# Patient Record
Sex: FEMALE | Race: OTHER | NOT HISPANIC OR LATINO | URBAN - METROPOLITAN AREA
[De-identification: names, ages, dates, MRNs, and addresses within clinical notes are randomized per-mention and may not be internally consistent; named-entity substitution may affect disease eponyms.]

---

## 2023-08-27 ENCOUNTER — EMERGENCY (EMERGENCY)
Facility: HOSPITAL | Age: 48
LOS: 1 days | Discharge: HOME | End: 2023-08-27
Attending: EMERGENCY MEDICINE
Payer: COMMERCIAL

## 2023-08-27 VITALS
TEMPERATURE: 98 F | SYSTOLIC BLOOD PRESSURE: 115 MMHG | DIASTOLIC BLOOD PRESSURE: 78 MMHG | RESPIRATION RATE: 16 BRPM | OXYGEN SATURATION: 98 % | HEART RATE: 80 BPM

## 2023-08-27 VITALS
DIASTOLIC BLOOD PRESSURE: 77 MMHG | HEIGHT: 66 IN | HEART RATE: 54 BPM | OXYGEN SATURATION: 99 % | TEMPERATURE: 98 F | SYSTOLIC BLOOD PRESSURE: 131 MMHG | RESPIRATION RATE: 18 BRPM | WEIGHT: 130.07 LBS

## 2023-08-27 PROCEDURE — 99284 EMERGENCY DEPT VISIT MOD MDM: CPT | Mod: 25

## 2023-08-27 PROCEDURE — 73630 X-RAY EXAM OF FOOT: CPT | Mod: 26,RT

## 2023-08-27 PROCEDURE — 73610 X-RAY EXAM OF ANKLE: CPT

## 2023-08-27 PROCEDURE — 73610 X-RAY EXAM OF ANKLE: CPT | Mod: 26,RT

## 2023-08-27 PROCEDURE — 99284 EMERGENCY DEPT VISIT MOD MDM: CPT

## 2023-08-27 PROCEDURE — 73630 X-RAY EXAM OF FOOT: CPT

## 2023-08-27 RX ORDER — ACETAMINOPHEN 500 MG
975 TABLET ORAL ONCE
Refills: 0 | Status: COMPLETED | OUTPATIENT
Start: 2023-08-27 | End: 2023-08-27

## 2023-08-27 RX ADMIN — Medication 975 MILLIGRAM(S): at 20:06

## 2023-08-27 NOTE — ED PROVIDER NOTE - PHYSICAL EXAMINATION
GEN: Pt in NAD, A&O x3.  PSYCH: Affect appropriate.  EYES: Sclera white w/o injection.   ENT: Head NCAT. Neck supple FROM.  RESP: No evidence of respiratory distress.  CARDIAC: RRR  VASC: 2+ dorsalis pedis pulses b/l.  MSK: TTP right dorsal lateral foot. No ttp of malleolus, no other bony ttp b/l LE. LLimited ROM R ankle and foot due to injury, FROM remainder of b/l LE.  NEURO: Normal and equal sensation and 5/5 strength b/l LE.  SKIN: Ecchymosis fo R dorsal lateral foot with focal edema and ttp.

## 2023-08-27 NOTE — ED PROVIDER NOTE - DISCHARGE REVIEW MATERIAL PRESENTED
If you are a smoker, it is important for your health to stop smoking. Please be aware that second hand smoke is also harmful.
Negative Screen
.

## 2023-08-27 NOTE — ED ADULT NURSE NOTE - OBJECTIVE STATEMENT
Pt is a 47 y/o female presents to the ED c/o R ankle injury this afternoon. States she rolled ankle while walking, unable to bear weight after sustaining injury. Pt presents alert & oriented x 4, calm, Breathing spontaneous & nonlabored. Swelling seen to R lateral aspect of leg. Denies numbness & tingling

## 2023-08-27 NOTE — ED PROVIDER NOTE - NSFOLLOWUPCLINICS_GEN_ALL_ED_FT
[facility]  Podiatry  [address] [place] [address] - [address] [place] [address]  Phone: [phone]  Fax:   Follow Up Time: 7-10 Days

## 2023-08-27 NOTE — ED PROVIDER NOTE - PATIENT PORTAL LINK FT
You can access the FollowMyHealth Patient Portal offered by [facility] by registering at the following website: [email or web address]. By joining [employer]’s FollowMyHealth portal, you will also be able to view your health information using other applications (apps) compatible with our system.

## 2023-08-27 NOTE — ED PROVIDER NOTE - ATTENDING CONTRIBUTION TO CARE
Is a 48-year-old female with no past medical history presenting status post inversion injury of her right foot noted to have ecchymosis and tenderness swelling to the left fifth metacarpal region no ankle pain full range of motion ankle came from urgent care as I do not have imaging capabilities to rule out Carrasquillo fracture Is a 48-year-old female with no past medical history presenting status post inversion injury of her right foot noted to have ecchymosis and tenderness swelling to the left fifth metacarpal region no ankle pain full range of motion ankle came from urgent care as I do not have imaging capabilities to rule out Carrasquillo fracture, no obvious fx seen placed in hard soled shoe and air splint, on wet read called back for avulsion fx, for post splint place made aware needs post splint, podiatry follow up.

## 2023-08-27 NOTE — ED PROVIDER NOTE - NSFOLLOWUPINSTRUCTIONS_ED_ALL_ED_FT
1) Follow-up with your PCP in 2-3 days.    Follow-up with podiatry in 7 days.  [facility]  Podiatry  [address] [place] [address] [place] [address]  Phone: [phone]    Bring all printed results to discuss with your provider.    2) Use the ACE wrap to compress the area. Rest the affected area as much as possible. Elevate the area, use ice to reduce swelling and pain.    3) Pain can be managed with Tylenol and Ibuprofen over the counter as directed.    Continue to take all other medications as directed.    4) Return to the emergency room immediately if your symptoms worsen or persist, or if you have a high or concerning fever, new or worsening pain in the affected area, numbness or tingling of the affected area, inability to move the affected area, increased swelling, redness of affected area, or if any other concerning or questionable symptoms.     Please read all attached patient information.

## 2023-08-27 NOTE — ED PROVIDER NOTE - NS ED ATTENDING STATEMENT MOD
Attending with This was a shared visit with the WILLIAM. I reviewed and verified the documentation and independently performed the documented:

## 2023-08-27 NOTE — ED ADULT TRIAGE NOTE - CHIEF COMPLAINT QUOTE
R ankle injury this afternoon (rolled ankle while walking), unable to bear weight after sustaining injury  R lateral aspect bruised and swollen

## 2023-08-27 NOTE — ED PROVIDER NOTE - ATTENDING APP SHARED VISIT CONTRIBUTION OF CARE
pt is a 48-year-old female with no past medical history presenting status post inversion injury of her right foot noted to have ecchymosis and tenderness swelling to the left fifth metacarpal region no ankle pain full range of motion ankle came from urgent care as I do not have imaging capabilities to rule out Carrasquillo fracture, no obvious fx seen placed in hard soled shoe and air splint, on wet read called back for avulsion fx, for post splint place made aware needs post splint, podiatry follow up.

## 2023-08-27 NOTE — ED PROVIDER NOTE - PROGRESS NOTE DETAILS
Results discussed with patient, patient pain slightly improved with oral analgesia.  Patient placed in Ace wrap, Aircast, postop shoe, using crutches which she brought with her to the ED without difficulty.  Advised patient on need for outpatient follow-up, and return precautions all questions answered. -Christian Figueroa PA-C

## 2023-08-27 NOTE — ED PROVIDER NOTE - OBJECTIVE STATEMENT
48-year-old female with significant past medical history not on anticoagulation.  Antiplatelets presents complaining of right ankle and foot pain.  Patient reports she was walking, actually rolled the ankle, has not been able to bear weight on the right foot since.  Patient was seen in the urgent care however they were not able to perform x-rays as the x-ray tech is not in today so she was referred to the emergency department.  Patient took ibuprofen 600 mg at 4 PM no other analgesia taken prior to arrival.  He denies numbness, paresthesias, weakness, other pain or injuries.

## 2023-08-27 NOTE — ED ADULT NURSE NOTE - NSFALLUNIVINTERV_ED_ALL_ED
Bed/Stretcher in lowest position, wheels locked, appropriate side rails in place/Call bell, personal items and telephone in reach/Instruct patient to call for assistance before getting out of bed/chair/stretcher/Non-slip footwear applied when patient is off stretcher/[place] to call system/Physically safe environment - no spills, clutter or unnecessary equipment/Purposeful proactive rounding/Room/bathroom lighting operational, light cord in reach

## 2023-08-28 ENCOUNTER — EMERGENCY (EMERGENCY)
Facility: HOSPITAL | Age: 48
LOS: 1 days | Discharge: HOME | End: 2023-08-28
Attending: EMERGENCY MEDICINE
Payer: COMMERCIAL

## 2023-08-28 VITALS
TEMPERATURE: 98 F | OXYGEN SATURATION: 98 % | DIASTOLIC BLOOD PRESSURE: 87 MMHG | WEIGHT: 130.07 LBS | HEART RATE: 94 BPM | SYSTOLIC BLOOD PRESSURE: 134 MMHG | HEIGHT: 66 IN | RESPIRATION RATE: 18 BRPM

## 2023-08-28 PROCEDURE — 28400 CLTX CALCANEAL FX W/O MNPJ: CPT | Mod: 54,RT

## 2023-08-28 PROCEDURE — G0463: CPT | Mod: 25

## 2023-08-28 PROCEDURE — 99284 EMERGENCY DEPT VISIT MOD MDM: CPT | Mod: 57

## 2023-08-28 PROCEDURE — 29515 APPLICATION SHORT LEG SPLINT: CPT | Mod: RT

## 2023-08-28 NOTE — ED PROVIDER NOTE - NSFOLLOWUPINSTRUCTIONS_ED_ALL_ED_FT
You were seen in the emergency department for evaluation of a calcaneal fracture that was seen on x-ray yesterday.  You were placed in a temporary cast called a splint.  Please keep the splint on and dry and clean at all times.    Do not place any weight on the right leg or foot until you are cleared to do so by the podiatrist you are seeing this upcoming week.    You may use Tylenol 650mg every 8 hours or Motrin 600mg every 8 hours as needed for pain. These are over the counter medications.    Return for worsening pain whitish or bluish discoloration of the toes or for any other concerns.

## 2023-08-28 NOTE — ED PROVIDER NOTE - CLINICAL SUMMARY MEDICAL DECISION MAKING FREE TEXT BOX
Attending MD Arellano: Patient presents to emergency department for evaluation of right foot and ankle pain.  Patient rolled her foot and was seen in this emergency department last night and had an x-ray that was initially read as negative however over read as "acute avulsion fracture along the lateral margin of the calcaneus just proximal to the calcaneocuboid joint with overlying soft tissue swelling".  Patient has been using a postoperative shoe.  She has a follow-up appointment with podiatry upcoming this Thursday.  Patient was instructed to come back to have a splint applied due to fracture noted.  She denies any numbness or tingling of the foot.    Exam shows ecchymosis of the lateral dorsal foot, sensation intact to light touch throughout wiggles all toes easily.  Calf compartment soft foot compartment soft.    Posterior splint applied as per procedure note.  Patient instructed to remain nonweightbearing on the right lower extremity until she has follow-up appointment with podiatry this upcoming Thursday for ongoing care.

## 2023-08-28 NOTE — ED PROVIDER NOTE - PATIENT PORTAL LINK FT
You can access the FollowMyHealth Patient Portal offered by [facility] by registering at the following website: [email or web address]. By joining [employer]’s FollowMyHealth portal, you will also be able to view your health information using other applications (apps) compatible with our system.

## 2023-08-28 NOTE — ED POST DISCHARGE NOTE - DETAILS
8/28: Pt made aware, spoke with her at length regarding fracture and indication for splint. Will come back to ED for eval. Audrey Mejia PA-C